# Patient Record
Sex: MALE | Race: WHITE | NOT HISPANIC OR LATINO | Employment: FULL TIME | URBAN - NONMETROPOLITAN AREA
[De-identification: names, ages, dates, MRNs, and addresses within clinical notes are randomized per-mention and may not be internally consistent; named-entity substitution may affect disease eponyms.]

---

## 2018-08-07 ENCOUNTER — OFFICE VISIT (OUTPATIENT)
Dept: ORTHOPEDIC SURGERY | Facility: CLINIC | Age: 23
End: 2018-08-07

## 2018-08-07 VITALS — RESPIRATION RATE: 18 BRPM | HEIGHT: 74 IN | BODY MASS INDEX: 40.43 KG/M2 | WEIGHT: 315 LBS

## 2018-08-07 DIAGNOSIS — L60.0 INGROWN NAIL: Primary | ICD-10-CM

## 2018-08-07 PROCEDURE — 99203 OFFICE O/P NEW LOW 30 MIN: CPT | Performed by: PODIATRIST

## 2018-08-07 PROCEDURE — 11750 EXCISION NAIL&NAIL MATRIX: CPT | Performed by: PODIATRIST

## 2018-08-07 RX ORDER — DOXYCYCLINE 50 MG/1
100 CAPSULE ORAL EVERY 12 HOURS SCHEDULED
Qty: 20 CAPSULE | Refills: 0 | Status: SHIPPED | OUTPATIENT
Start: 2018-08-07

## 2018-08-07 RX ORDER — DOXYCYCLINE 50 MG/1
100 CAPSULE ORAL EVERY 12 HOURS SCHEDULED
Qty: 20 CAPSULE | Refills: 0 | Status: SHIPPED | OUTPATIENT
Start: 2018-08-07 | End: 2018-08-07 | Stop reason: SDUPTHER

## 2018-08-07 RX ORDER — AMOXICILLIN AND CLAVULANATE POTASSIUM 875; 125 MG/1; MG/1
TABLET, FILM COATED ORAL
Refills: 0 | COMMUNITY
Start: 2018-07-19

## 2018-08-07 NOTE — PROGRESS NOTES
Subjective   Patient ID: Rodney Davidson is a 23 y.o. male   Ingrown Toenail of the Left Foot (Left great toe) and Ingrown Toenail of the Right Knee (Right great toe)  Presents today with complaint of bilateral great toe ingrown toenails.  States about a year ago he had these removed and within 4 months they return.  States he's been soaking the minutes and salt and has been on a 10 day course of antibiotics which he finished yesterday.  Still complains of pain in both great toes.  States he like to have them removed permanently effect to be done.    History of Present Illness                                                   Review of Systems   Constitutional: Negative for diaphoresis, fever and unexpected weight change.   HENT: Negative for dental problem and sore throat.    Eyes: Negative for visual disturbance.   Respiratory: Negative for shortness of breath.    Cardiovascular: Negative for chest pain.   Gastrointestinal: Negative for abdominal pain, constipation, diarrhea, nausea and vomiting.   Genitourinary: Negative for difficulty urinating and frequency.   Musculoskeletal: Positive for arthralgias.   Neurological: Negative for headaches.   Hematological: Does not bruise/bleed easily.   All other systems reviewed and are negative.      History reviewed. No pertinent past medical history.     History reviewed. No pertinent surgical history.    Allergies   Allergen Reactions   • Sulfa Antibiotics Hives         Current Outpatient Prescriptions:   •  amoxicillin-clavulanate (AUGMENTIN) 875-125 MG per tablet, , Disp: , Rfl: 0    History reviewed. No pertinent family history.    Social History     Social History   • Marital status:      Spouse name: N/A   • Number of children: N/A   • Years of education: N/A     Occupational History   • Not on file.     Social History Main Topics   • Smoking status: Never Smoker   • Smokeless tobacco: Never Used   • Alcohol use No   • Drug use: No   • Sexual activity: Defer      Other Topics Concern   • Not on file     Social History Narrative   • No narrative on file       Counseling given: Not Answered       I have reviewed all of the above social hx, family hx, surgical hx, medications, allergies & ROS and confirm that it is accurate.  Objective   Physical Exam   Constitutional: He is oriented to person, place, and time. He appears well-developed and well-nourished.   HENT:   Head: Normocephalic and atraumatic.   Nose: Nose normal.   Eyes: Pupils are equal, round, and reactive to light. Conjunctivae and EOM are normal.   Neck: Normal range of motion.   Pulmonary/Chest: Effort normal.   Abdominal: Soft.   Neurological: He is alert and oriented to person, place, and time.   Skin: Skin is warm.   Psychiatric: He has a normal mood and affect. His behavior is normal. Judgment and thought content normal.   Nursing note and vitals reviewed.    Ortho Exam  Ortho Exambilateral  Lower extremity exam:  Vascular: Pulses palpable, pedal hair noted, CFT brisk, no edema noted  Neuro: Gross sensation intact  Derm: Normal turgor and temperature, lateral borders of both great toenails are incurvated and swollen and edematous with the pus and purulence and drainage.  They have tried scab and proud flesh noted.  Nails are cut very irregular and sharp.  Both hallux are tender to palpation.  MSK: Joint range of motion normal, manual muscle testing normal,    Assessment/Plan bilateral hallux lateral toenail border ingrown toenail Independent Review of Radiographic Studies:      Laboratory and Other Studies:     Medical Decision Making:        Nail Removal  Date/Time: 8/7/2018 4:13 PM  Performed by: DESTINI RAMÍREZ  Authorized by: DESTINI RAMÍREZ   Consent: Verbal consent obtained. Written consent obtained.  Risks and benefits: risks, benefits and alternatives were discussed  Consent given by: patient  Patient understanding: patient states understanding of the procedure being performed  Patient consent:  "the patient's understanding of the procedure matches consent given  Procedure consent: procedure consent matches procedure scheduled  Relevant documents: relevant documents present and verified  Test results: test results available and properly labeled  Site marked: the operative site was marked  Imaging studies: imaging studies available  Patient identity confirmed: verbally with patient  Time out: Immediately prior to procedure a \"time out\" was called to verify the correct patient, procedure, equipment, support staff and site/side marked as required.  Location: left foot  Location details: left big toe  Anesthesia: digital block    Anesthesia:  Local Anesthetic: lidocaine 1% without epinephrine, bupivacaine 0.5% without epinephrine and topical anesthetic  Anesthetic total: 5 mL    Sedation:  Patient sedated: no  Preparation: skin prepped with Betadine and sterile field established  Amount removed: partial  Side: lateral  Nail matrix removed: partial  Dressing: 4x4, gauze roll, antibiotic ointment and dressing applied  Patient tolerance: Patient tolerated the procedure well with no immediate complications  Comments: Phenol used    Nail Removal  Date/Time: 8/7/2018 4:13 PM  Performed by: DESTINI RAMÍREZ  Authorized by: DESTINI RAMÍREZ   Consent: Verbal consent obtained. Written consent obtained.  Risks and benefits: risks, benefits and alternatives were discussed  Consent given by: patient  Patient understanding: patient states understanding of the procedure being performed  Patient consent: the patient's understanding of the procedure matches consent given  Procedure consent: procedure consent matches procedure scheduled  Relevant documents: relevant documents present and verified  Test results: test results available and properly labeled  Site marked: the operative site was marked  Imaging studies: imaging studies available  Patient identity confirmed: verbally with patient  Time out: Immediately prior to " "procedure a \"time out\" was called to verify the correct patient, procedure, equipment, support staff and site/side marked as required.  Location: right foot  Location details: right big toe  Anesthesia: digital block    Anesthesia:  Local Anesthetic: lidocaine 1% without epinephrine, bupivacaine 0.5% without epinephrine and topical anesthetic  Anesthetic total: 5 mL  Preparation: skin prepped with Betadine and sterile field established  Amount removed: partial  Side: lateral  Nail bed sutured: no  Nail matrix removed: partial  Dressing: 4x4, antibiotic ointment and gauze roll  Patient tolerance: Patient tolerated the procedure well with no immediate complications  Comments: Phenol used        [] No procedures were performed in office today.    Rodney was seen today for ingrown toenail and ingrown toenail.    Diagnoses and all orders for this visit:    Ingrown nail  -     Nail Removal  -     Nail Removal          Recommendations/Plan:  Patient's nail was removed without incident and a dry sterile dressing placed.  There given postop nail instructions.  They're to soak this foot daily for 15 minutes in warm Epsom salt water or dial soapy water. They are to keep it clean dry and covered with a Band-Aid.  I warned them of any signs of infection and to keep this out of any dirty water.  follow up in 2 weeks.  He'll be sent wound care products to apply to both great toes daily.  I will also continue him on antibiotics and prescribed doxycycline for 7 days.  I warned him of the potential for recurrence and lack of effectiveness of phenol in the presence of infection but he states he is busy with work and school and does not want to have to come back for many additional visits and he would like to have them both taking care of today.    Return in about 2 weeks (around 8/21/2018).  Patient agreeable to call or return sooner for any concerns.       "

## 2019-08-05 ENCOUNTER — TRANSCRIBE ORDERS (OUTPATIENT)
Dept: ADMINISTRATIVE | Facility: HOSPITAL | Age: 24
End: 2019-08-05

## 2019-08-05 DIAGNOSIS — E66.3 OVER WEIGHT: ICD-10-CM

## 2019-08-05 DIAGNOSIS — R53.83 FATIGUE, UNSPECIFIED TYPE: ICD-10-CM

## 2019-08-05 DIAGNOSIS — B07.9 VIRAL WARTS, UNSPECIFIED TYPE: ICD-10-CM

## 2019-08-05 DIAGNOSIS — L60.0 INGROWING NAIL: ICD-10-CM

## 2019-08-05 DIAGNOSIS — K21.9 GASTRO-ESOPHAGEAL REFLUX DISEASE WITHOUT ESOPHAGITIS: Primary | ICD-10-CM

## 2019-08-05 DIAGNOSIS — L03.031 CELLULITIS OF TOE, RIGHT: ICD-10-CM

## 2019-08-13 ENCOUNTER — HOSPITAL ENCOUNTER (OUTPATIENT)
Dept: ULTRASOUND IMAGING | Facility: HOSPITAL | Age: 24
End: 2019-08-13

## 2019-08-13 ENCOUNTER — APPOINTMENT (OUTPATIENT)
Dept: ULTRASOUND IMAGING | Facility: HOSPITAL | Age: 24
End: 2019-08-13

## 2019-08-20 ENCOUNTER — HOSPITAL ENCOUNTER (OUTPATIENT)
Dept: ULTRASOUND IMAGING | Facility: HOSPITAL | Age: 24
Discharge: HOME | End: 2019-08-20
Admitting: NURSE PRACTITIONER

## 2019-08-20 DIAGNOSIS — K21.9 GASTRO-ESOPHAGEAL REFLUX DISEASE WITHOUT ESOPHAGITIS: ICD-10-CM

## 2019-08-20 DIAGNOSIS — L60.0 INGROWING NAIL: ICD-10-CM

## 2019-08-20 DIAGNOSIS — E66.3 OVER WEIGHT: ICD-10-CM

## 2019-08-20 DIAGNOSIS — L03.031 CELLULITIS OF TOE, RIGHT: ICD-10-CM

## 2019-08-20 DIAGNOSIS — R53.83 FATIGUE, UNSPECIFIED TYPE: ICD-10-CM

## 2019-08-20 DIAGNOSIS — B07.9 VIRAL WARTS, UNSPECIFIED TYPE: ICD-10-CM

## 2019-08-20 PROCEDURE — 76705 ECHO EXAM OF ABDOMEN: CPT

## 2019-08-26 ENCOUNTER — DOCUMENTATION (OUTPATIENT)
Dept: NUTRITION | Facility: HOSPITAL | Age: 24
End: 2019-08-26

## 2019-08-26 NOTE — PROGRESS NOTES
Nutrition Services    Patient Name:  Rodney Davidson  YOB: 1995  MRN: [id number]  Admit Date:  (Not on file)    Scheduling unable to reach pt by phone x 3 attempts and 3 days to schedule a nutrition appointment. A follow-up letter is being mailed this date with program contact information.    Electronically signed by:  Judith Thomson RD  08/26/19 8:38 AM

## 2019-09-26 ENCOUNTER — TRANSCRIBE ORDERS (OUTPATIENT)
Dept: LAB | Facility: HOSPITAL | Age: 24
End: 2019-09-26

## 2019-09-26 ENCOUNTER — APPOINTMENT (OUTPATIENT)
Dept: LAB | Facility: HOSPITAL | Age: 24
End: 2019-09-26

## 2019-09-26 DIAGNOSIS — N46.9 INFERTILITY MALE: Primary | ICD-10-CM

## 2019-09-26 LAB
COLOR SMN: NORMAL
LIQUEFACTION TIME SMN: YES MIN
PH SMN: 8 [PH] (ref 7.2–10)
SPECIMEN VOL SMN: 3.5 ML (ref 2–100)
SPERM SMN: NORMAL
VISC SMN: 2 CM

## 2019-09-26 PROCEDURE — 89320 SEMEN ANAL VOL/COUNT/MOT: CPT | Performed by: ADVANCED PRACTICE MIDWIFE

## 2019-10-17 ENCOUNTER — TRANSCRIBE ORDERS (OUTPATIENT)
Dept: LAB | Facility: HOSPITAL | Age: 24
End: 2019-10-17

## 2019-10-17 ENCOUNTER — LAB (OUTPATIENT)
Dept: LAB | Facility: HOSPITAL | Age: 24
End: 2019-10-17

## 2019-10-17 DIAGNOSIS — N46.01 AZOOSPERMIA: ICD-10-CM

## 2019-10-17 DIAGNOSIS — N46.01 AZOOSPERMIA: Primary | ICD-10-CM

## 2019-10-17 LAB
COLOR SMN: ABNORMAL
LIQUEFACTION TIME SMN: YES MIN
MAJOR STRUCTURAL DEFECTS, %: 2 %
PH SMN: 8 [PH] (ref 7.2–10)
SPECIMEN VOL SMN: 3.5 ML (ref 2–100)
SPERM # SMN: 0.7 MILLIONS/ML (ref 40–1000)
SPERM DENSITY: 0.2 MILLIONS/ML (ref 20–1000)
SPERM MOTILE NFR SMN: 0 MILLIONS/ML (ref 20–1000)
SPERM MOTILE NFR SMN: 0.01 % MOTILE (ref 50–100)
SPERM SMN: ABNORMAL
VISC SMN: 1 CM

## 2019-10-17 PROCEDURE — 89320 SEMEN ANAL VOL/COUNT/MOT: CPT

## 2019-10-17 PROCEDURE — 84403 ASSAY OF TOTAL TESTOSTERONE: CPT

## 2019-10-17 PROCEDURE — 84402 ASSAY OF FREE TESTOSTERONE: CPT

## 2019-10-17 PROCEDURE — 36415 COLL VENOUS BLD VENIPUNCTURE: CPT

## 2019-10-20 LAB
TESTOST FREE SERPL-MCNC: 5.2 PG/ML (ref 9.3–26.5)
TESTOST SERPL-MCNC: 201 NG/DL (ref 264–916)

## 2022-08-27 ENCOUNTER — APPOINTMENT (OUTPATIENT)
Dept: CT IMAGING | Facility: HOSPITAL | Age: 27
DRG: 440 | End: 2022-08-27
Payer: COMMERCIAL

## 2022-08-27 ENCOUNTER — HOSPITAL ENCOUNTER (INPATIENT)
Facility: HOSPITAL | Age: 27
LOS: 1 days | Discharge: ANOTHER HOSPITAL | DRG: 440 | End: 2022-08-28
Attending: FAMILY MEDICINE | Admitting: INTERNAL MEDICINE
Payer: COMMERCIAL

## 2022-08-27 DIAGNOSIS — K85.90 ACUTE PANCREATITIS WITHOUT INFECTION OR NECROSIS, UNSPECIFIED PANCREATITIS TYPE: Primary | ICD-10-CM

## 2022-08-27 PROBLEM — E66.01 MORBID OBESITY (HCC): Status: ACTIVE | Noted: 2022-08-27

## 2022-08-27 PROBLEM — Z90.3 H/O GASTRIC SLEEVE: Status: ACTIVE | Noted: 2022-08-27

## 2022-08-27 PROBLEM — R74.01 TRANSAMINITIS: Status: ACTIVE | Noted: 2022-08-27

## 2022-08-27 PROBLEM — E80.6 HYPERBILIRUBINEMIA: Status: ACTIVE | Noted: 2022-08-27

## 2022-08-27 LAB
A/G RATIO: 1.5 (ref 0.8–2)
A/G RATIO: 1.5 (ref 0.8–2)
ALBUMIN SERPL-MCNC: 4.4 G/DL (ref 3.4–4.8)
ALBUMIN SERPL-MCNC: 4.9 G/DL (ref 3.4–4.8)
ALP BLD-CCNC: 128 U/L (ref 25–100)
ALP BLD-CCNC: 142 U/L (ref 25–100)
ALT SERPL-CCNC: 403 U/L (ref 4–36)
ALT SERPL-CCNC: 465 U/L (ref 4–36)
AMPHETAMINE SCREEN, URINE: ABNORMAL
ANION GAP SERPL CALCULATED.3IONS-SCNC: 13 MMOL/L (ref 3–16)
ANION GAP SERPL CALCULATED.3IONS-SCNC: 19 MMOL/L (ref 3–16)
AST SERPL-CCNC: 237 U/L (ref 8–33)
AST SERPL-CCNC: 289 U/L (ref 8–33)
BARBITURATE SCREEN URINE: ABNORMAL
BASOPHILS ABSOLUTE: 0 K/UL (ref 0–0.1)
BASOPHILS RELATIVE PERCENT: 0.2 %
BENZODIAZEPINE SCREEN, URINE: ABNORMAL
BILIRUB SERPL-MCNC: 4.1 MG/DL (ref 0.3–1.2)
BILIRUB SERPL-MCNC: 4.5 MG/DL (ref 0.3–1.2)
BUN BLDV-MCNC: 10 MG/DL (ref 6–20)
BUN BLDV-MCNC: 10 MG/DL (ref 6–20)
BUPRENORPHINE QUAL, URINE: ABNORMAL
CALCIUM SERPL-MCNC: 9.2 MG/DL (ref 8.5–10.5)
CALCIUM SERPL-MCNC: 9.7 MG/DL (ref 8.5–10.5)
CANNABINOID SCREEN URINE: ABNORMAL
CHLORIDE BLD-SCNC: 101 MMOL/L (ref 98–107)
CHLORIDE BLD-SCNC: 98 MMOL/L (ref 98–107)
CHOLESTEROL, TOTAL: 167 MG/DL (ref 0–200)
CO2: 20 MMOL/L (ref 20–30)
CO2: 24 MMOL/L (ref 20–30)
COCAINE METABOLITE SCREEN URINE: ABNORMAL
CREAT SERPL-MCNC: 0.9 MG/DL (ref 0.4–1.2)
CREAT SERPL-MCNC: 1 MG/DL (ref 0.4–1.2)
EOSINOPHILS ABSOLUTE: 0.2 K/UL (ref 0–0.4)
EOSINOPHILS RELATIVE PERCENT: 1.4 %
ETHANOL: NORMAL MG/DL (ref 0–0.08)
GFR AFRICAN AMERICAN: >59
GFR AFRICAN AMERICAN: >59
GFR NON-AFRICAN AMERICAN: >59
GFR NON-AFRICAN AMERICAN: >59
GLOBULIN: 2.9 G/DL
GLOBULIN: 3.3 G/DL
GLUCOSE BLD-MCNC: 119 MG/DL (ref 74–106)
GLUCOSE BLD-MCNC: 149 MG/DL (ref 74–106)
HAV IGM SER IA-ACNC: NORMAL
HCT VFR BLD CALC: 48.5 % (ref 40–54)
HDLC SERPL-MCNC: 49 MG/DL (ref 40–60)
HEMOGLOBIN: 16.5 G/DL (ref 13–18)
HEPATITIS B CORE IGM ANTIBODY: NORMAL
HEPATITIS B SURFACE ANTIGEN INTERPRETATION: NORMAL
HEPATITIS C ANTIBODY INTERPRETATION: NORMAL
IMMATURE GRANULOCYTES #: 0 K/UL
IMMATURE GRANULOCYTES %: 0.3 % (ref 0–5)
LDL CHOLESTEROL CALCULATED: 104 MG/DL
LIPASE: >3000 U/L (ref 5.6–51.3)
LYMPHOCYTES ABSOLUTE: 5 K/UL (ref 1.5–4)
LYMPHOCYTES RELATIVE PERCENT: 41.3 %
Lab: ABNORMAL
MAGNESIUM: 2 MG/DL (ref 1.7–2.4)
MCH RBC QN AUTO: 28.9 PG (ref 27–32)
MCHC RBC AUTO-ENTMCNC: 34 G/DL (ref 31–35)
MCV RBC AUTO: 84.9 FL (ref 80–100)
METHADONE SCREEN, URINE: ABNORMAL
METHAMPHETAMINE, URINE: ABNORMAL
MONOCYTES ABSOLUTE: 0.9 K/UL (ref 0.2–0.8)
MONOCYTES RELATIVE PERCENT: 7 %
NEUTROPHILS ABSOLUTE: 6 K/UL (ref 2–7.5)
NEUTROPHILS RELATIVE PERCENT: 49.8 %
OPIATE SCREEN URINE: POSITIVE
PDW BLD-RTO: 12.6 % (ref 11–16)
PHENCYCLIDINE SCREEN URINE: ABNORMAL
PLATELET # BLD: 307 K/UL (ref 150–400)
PMV BLD AUTO: 11.2 FL (ref 6–10)
POTASSIUM SERPL-SCNC: 3 MMOL/L (ref 3.4–5.1)
POTASSIUM SERPL-SCNC: 4.1 MMOL/L (ref 3.4–5.1)
PROPOXYPHENE SCREEN, URINE: ABNORMAL
RBC # BLD: 5.71 M/UL (ref 4.5–6)
SARS-COV-2, NAAT: NOT DETECTED
SODIUM BLD-SCNC: 137 MMOL/L (ref 136–145)
SODIUM BLD-SCNC: 138 MMOL/L (ref 136–145)
TOTAL PROTEIN: 7.3 G/DL (ref 6.4–8.3)
TOTAL PROTEIN: 8.2 G/DL (ref 6.4–8.3)
TRICYCLIC, URINE: ABNORMAL
TRIGL SERPL-MCNC: 72 MG/DL (ref 0–249)
TROPONIN: <0.3 NG/ML
UR OXYCODONE RAPID SCREEN: ABNORMAL
VLDLC SERPL CALC-MCNC: 14 MG/DL
WBC # BLD: 12.1 K/UL (ref 4–11)

## 2022-08-27 PROCEDURE — 74177 CT ABD & PELVIS W/CONTRAST: CPT

## 2022-08-27 PROCEDURE — A4216 STERILE WATER/SALINE, 10 ML: HCPCS | Performed by: PHYSICIAN ASSISTANT

## 2022-08-27 PROCEDURE — 80074 ACUTE HEPATITIS PANEL: CPT

## 2022-08-27 PROCEDURE — 80061 LIPID PANEL: CPT

## 2022-08-27 PROCEDURE — C9113 INJ PANTOPRAZOLE SODIUM, VIA: HCPCS | Performed by: PHYSICIAN ASSISTANT

## 2022-08-27 PROCEDURE — 6360000002 HC RX W HCPCS: Performed by: FAMILY MEDICINE

## 2022-08-27 PROCEDURE — 93005 ELECTROCARDIOGRAM TRACING: CPT

## 2022-08-27 PROCEDURE — 87635 SARS-COV-2 COVID-19 AMP PRB: CPT

## 2022-08-27 PROCEDURE — 99222 1ST HOSP IP/OBS MODERATE 55: CPT | Performed by: INTERNAL MEDICINE

## 2022-08-27 PROCEDURE — 6370000000 HC RX 637 (ALT 250 FOR IP): Performed by: FAMILY MEDICINE

## 2022-08-27 PROCEDURE — 99285 EMERGENCY DEPT VISIT HI MDM: CPT

## 2022-08-27 PROCEDURE — 6360000004 HC RX CONTRAST MEDICATION: Performed by: FAMILY MEDICINE

## 2022-08-27 PROCEDURE — 96375 TX/PRO/DX INJ NEW DRUG ADDON: CPT

## 2022-08-27 PROCEDURE — 96374 THER/PROPH/DIAG INJ IV PUSH: CPT

## 2022-08-27 PROCEDURE — 6360000002 HC RX W HCPCS: Performed by: PHYSICIAN ASSISTANT

## 2022-08-27 PROCEDURE — 80307 DRUG TEST PRSMV CHEM ANLYZR: CPT

## 2022-08-27 PROCEDURE — 6370000000 HC RX 637 (ALT 250 FOR IP): Performed by: PHYSICIAN ASSISTANT

## 2022-08-27 PROCEDURE — 85025 COMPLETE CBC W/AUTO DIFF WBC: CPT

## 2022-08-27 PROCEDURE — 82077 ASSAY SPEC XCP UR&BREATH IA: CPT

## 2022-08-27 PROCEDURE — 84484 ASSAY OF TROPONIN QUANT: CPT

## 2022-08-27 PROCEDURE — 80053 COMPREHEN METABOLIC PANEL: CPT

## 2022-08-27 PROCEDURE — 83690 ASSAY OF LIPASE: CPT

## 2022-08-27 PROCEDURE — 2580000003 HC RX 258: Performed by: PHYSICIAN ASSISTANT

## 2022-08-27 PROCEDURE — 2580000003 HC RX 258: Performed by: FAMILY MEDICINE

## 2022-08-27 PROCEDURE — 83735 ASSAY OF MAGNESIUM: CPT

## 2022-08-27 PROCEDURE — 36415 COLL VENOUS BLD VENIPUNCTURE: CPT

## 2022-08-27 PROCEDURE — 1200000000 HC SEMI PRIVATE

## 2022-08-27 RX ORDER — ONDANSETRON 4 MG/1
4 TABLET, ORALLY DISINTEGRATING ORAL EVERY 8 HOURS PRN
Status: DISCONTINUED | OUTPATIENT
Start: 2022-08-27 | End: 2022-08-28 | Stop reason: HOSPADM

## 2022-08-27 RX ORDER — 0.9 % SODIUM CHLORIDE 0.9 %
1000 INTRAVENOUS SOLUTION INTRAVENOUS ONCE
Status: COMPLETED | OUTPATIENT
Start: 2022-08-27 | End: 2022-08-27

## 2022-08-27 RX ORDER — MORPHINE SULFATE 4 MG/ML
2 INJECTION, SOLUTION INTRAMUSCULAR; INTRAVENOUS ONCE
Status: COMPLETED | OUTPATIENT
Start: 2022-08-27 | End: 2022-08-27

## 2022-08-27 RX ORDER — KETOROLAC TROMETHAMINE 30 MG/ML
30 INJECTION, SOLUTION INTRAMUSCULAR; INTRAVENOUS ONCE
Status: COMPLETED | OUTPATIENT
Start: 2022-08-27 | End: 2022-08-27

## 2022-08-27 RX ORDER — ENOXAPARIN SODIUM 100 MG/ML
30 INJECTION SUBCUTANEOUS 2 TIMES DAILY
Status: DISCONTINUED | OUTPATIENT
Start: 2022-08-27 | End: 2022-08-28 | Stop reason: HOSPADM

## 2022-08-27 RX ORDER — SODIUM CHLORIDE, SODIUM LACTATE, POTASSIUM CHLORIDE, CALCIUM CHLORIDE 600; 310; 30; 20 MG/100ML; MG/100ML; MG/100ML; MG/100ML
INJECTION, SOLUTION INTRAVENOUS CONTINUOUS
Status: DISCONTINUED | OUTPATIENT
Start: 2022-08-27 | End: 2022-08-28 | Stop reason: HOSPADM

## 2022-08-27 RX ORDER — MORPHINE SULFATE 2 MG/ML
2 INJECTION, SOLUTION INTRAMUSCULAR; INTRAVENOUS
Status: DISCONTINUED | OUTPATIENT
Start: 2022-08-27 | End: 2022-08-27

## 2022-08-27 RX ORDER — ONDANSETRON 4 MG/1
4 TABLET, ORALLY DISINTEGRATING ORAL ONCE
Status: DISCONTINUED | OUTPATIENT
Start: 2022-08-27 | End: 2022-08-27

## 2022-08-27 RX ORDER — SERTRALINE HYDROCHLORIDE 100 MG/1
50 TABLET, FILM COATED ORAL DAILY
COMMUNITY

## 2022-08-27 RX ORDER — ONDANSETRON 2 MG/ML
4 INJECTION INTRAMUSCULAR; INTRAVENOUS EVERY 6 HOURS PRN
Status: DISCONTINUED | OUTPATIENT
Start: 2022-08-27 | End: 2022-08-28 | Stop reason: HOSPADM

## 2022-08-27 RX ORDER — SODIUM CHLORIDE, SODIUM LACTATE, POTASSIUM CHLORIDE, CALCIUM CHLORIDE 600; 310; 30; 20 MG/100ML; MG/100ML; MG/100ML; MG/100ML
INJECTION, SOLUTION INTRAVENOUS CONTINUOUS
Status: ACTIVE | OUTPATIENT
Start: 2022-08-27 | End: 2022-08-27

## 2022-08-27 RX ORDER — ENOXAPARIN SODIUM 100 MG/ML
40 INJECTION SUBCUTANEOUS DAILY
Status: DISCONTINUED | OUTPATIENT
Start: 2022-08-27 | End: 2022-08-27 | Stop reason: DRUGHIGH

## 2022-08-27 RX ORDER — POTASSIUM CHLORIDE 750 MG/1
40 CAPSULE, EXTENDED RELEASE ORAL ONCE
Status: COMPLETED | OUTPATIENT
Start: 2022-08-27 | End: 2022-08-27

## 2022-08-27 RX ORDER — OXYCODONE HYDROCHLORIDE 5 MG/1
5 TABLET ORAL EVERY 4 HOURS PRN
Status: DISCONTINUED | OUTPATIENT
Start: 2022-08-27 | End: 2022-08-28 | Stop reason: HOSPADM

## 2022-08-27 RX ORDER — ONDANSETRON 2 MG/ML
4 INJECTION INTRAMUSCULAR; INTRAVENOUS ONCE
Status: COMPLETED | OUTPATIENT
Start: 2022-08-27 | End: 2022-08-27

## 2022-08-27 RX ADMIN — KETOROLAC TROMETHAMINE 30 MG: 30 INJECTION, SOLUTION INTRAMUSCULAR at 04:02

## 2022-08-27 RX ADMIN — ONDANSETRON 4 MG: 2 INJECTION INTRAMUSCULAR; INTRAVENOUS at 04:03

## 2022-08-27 RX ADMIN — HYDROMORPHONE HYDROCHLORIDE 0.5 MG: 1 INJECTION, SOLUTION INTRAMUSCULAR; INTRAVENOUS; SUBCUTANEOUS at 13:37

## 2022-08-27 RX ADMIN — ENOXAPARIN SODIUM 30 MG: 100 INJECTION SUBCUTANEOUS at 20:50

## 2022-08-27 RX ADMIN — SODIUM CHLORIDE 40 MG: 9 INJECTION INTRAMUSCULAR; INTRAVENOUS; SUBCUTANEOUS at 09:08

## 2022-08-27 RX ADMIN — ONDANSETRON 4 MG: 2 INJECTION INTRAMUSCULAR; INTRAVENOUS at 22:13

## 2022-08-27 RX ADMIN — HYDROMORPHONE HYDROCHLORIDE 0.5 MG: 1 INJECTION, SOLUTION INTRAMUSCULAR; INTRAVENOUS; SUBCUTANEOUS at 18:57

## 2022-08-27 RX ADMIN — OXYCODONE HYDROCHLORIDE 5 MG: 5 TABLET ORAL at 11:11

## 2022-08-27 RX ADMIN — MORPHINE SULFATE 2 MG: 2 INJECTION, SOLUTION INTRAMUSCULAR; INTRAVENOUS at 08:32

## 2022-08-27 RX ADMIN — SODIUM CHLORIDE, POTASSIUM CHLORIDE, SODIUM LACTATE AND CALCIUM CHLORIDE: 600; 310; 30; 20 INJECTION, SOLUTION INTRAVENOUS at 20:53

## 2022-08-27 RX ADMIN — HYDROMORPHONE HYDROCHLORIDE 0.5 MG: 1 INJECTION, SOLUTION INTRAMUSCULAR; INTRAVENOUS; SUBCUTANEOUS at 22:13

## 2022-08-27 RX ADMIN — HYDROMORPHONE HYDROCHLORIDE 0.5 MG: 1 INJECTION, SOLUTION INTRAMUSCULAR; INTRAVENOUS; SUBCUTANEOUS at 06:36

## 2022-08-27 RX ADMIN — LIDOCAINE HYDROCHLORIDE: 20 SOLUTION ORAL; TOPICAL at 04:02

## 2022-08-27 RX ADMIN — IOPAMIDOL 100 ML: 755 INJECTION, SOLUTION INTRAVENOUS at 05:04

## 2022-08-27 RX ADMIN — MORPHINE SULFATE 2 MG: 4 INJECTION, SOLUTION INTRAMUSCULAR; INTRAVENOUS at 04:48

## 2022-08-27 RX ADMIN — ONDANSETRON 4 MG: 2 INJECTION INTRAMUSCULAR; INTRAVENOUS at 09:26

## 2022-08-27 RX ADMIN — ONDANSETRON 4 MG: 2 INJECTION INTRAMUSCULAR; INTRAVENOUS at 18:57

## 2022-08-27 RX ADMIN — SERTRALINE HYDROCHLORIDE 50 MG: 50 TABLET ORAL at 20:50

## 2022-08-27 RX ADMIN — OXYCODONE HYDROCHLORIDE 5 MG: 5 TABLET ORAL at 20:50

## 2022-08-27 RX ADMIN — OXYCODONE HYDROCHLORIDE 5 MG: 5 TABLET ORAL at 16:44

## 2022-08-27 RX ADMIN — POTASSIUM CHLORIDE 40 MEQ: 10 CAPSULE, COATED, EXTENDED RELEASE ORAL at 08:35

## 2022-08-27 RX ADMIN — HYDROMORPHONE HYDROCHLORIDE 0.5 MG: 1 INJECTION, SOLUTION INTRAMUSCULAR; INTRAVENOUS; SUBCUTANEOUS at 09:48

## 2022-08-27 RX ADMIN — SODIUM CHLORIDE, POTASSIUM CHLORIDE, SODIUM LACTATE AND CALCIUM CHLORIDE: 600; 310; 30; 20 INJECTION, SOLUTION INTRAVENOUS at 09:19

## 2022-08-27 RX ADMIN — SODIUM CHLORIDE 1000 ML: 9 INJECTION, SOLUTION INTRAVENOUS at 04:06

## 2022-08-27 ASSESSMENT — PAIN SCALES - GENERAL
PAINLEVEL_OUTOF10: 8
PAINLEVEL_OUTOF10: 7
PAINLEVEL_OUTOF10: 6
PAINLEVEL_OUTOF10: 7
PAINLEVEL_OUTOF10: 7
PAINLEVEL_OUTOF10: 9
PAINLEVEL_OUTOF10: 9
PAINLEVEL_OUTOF10: 7

## 2022-08-27 ASSESSMENT — ENCOUNTER SYMPTOMS
NAUSEA: 1
VOMITING: 1
ABDOMINAL PAIN: 1

## 2022-08-27 NOTE — ED PROVIDER NOTES
[address]  eMERGENCY dEPARTMENT eNCOUnter      Pt Name: Marce Boo  MRN: [id number]  [place] 1995  Date of evaluation: 8/27/2022  Provider: Ramu Bar Dr       Chief Complaint   Patient presents with    Abdominal Pain    Chest Pain     2 hrs ago, woke him up from sleep      Emesis         HISTORY OF PRESENT ILLNESS   (Location/Symptom, Timing/Onset, Context/Setting, Quality, Duration, Modifying Factors, Severity)  Note limiting factors. Marce Boo is a 24 y.o. male who presents to the emergency department for waking up with epigastric abdominal pain, pain radiating to his mid chest, having multiple episodes of nausea and vomiting. Pt states that the pain woke him up and started vomiting shortly after that. He thought where he had gastric sleeve in past and trouble with dairy that since he consumed some, it could have caused his symptoms. No shortness of breath, no back pain. No diarrhea. No fever. No flank pain. Pain 8/10, sharp, aching, constant. No change with movement. Pt noted to be sweating when he arrived. Nursing Notes were reviewed. REVIEW OF SYSTEMS    (2-9 systems for level 4, 10 or more forlevel 5)     Review of Systems   Constitutional:  Positive for activity change, appetite change and diaphoresis. Cardiovascular:  Positive for chest pain. Gastrointestinal:  Positive for abdominal pain, nausea and vomiting. All other systems reviewed and are negative. PAST MEDICAL HISTORY     Past Medical History:   Diagnosis Date    Anxiety     Depression          SURGICAL HISTORY     No past surgical history on file.       CURRENT MEDICATIONS       Current Discharge Medication List        CONTINUE these medications which have NOT CHANGED    Details   sertraline (ZOLOFT) 100 MG tablet Take 50 mg by mouth daily Unsure of dose             ALLERGIES     Sulfa antibiotics    FAMILY HISTORY       Family History   Problem Relation Age of Onset    Heart Disease Maternal Grandmother     Diabetes Maternal Grandfather     Diabetes Paternal Grandfather           SOCIAL HISTORY       Social History     Socioeconomic History    Marital status: Single       SCREENINGS             PHYSICAL EXAM    (up to 7 for level 4, 8 or more for level 5)     ED Triage Vitals [08/27/22 0315]   BP Temp Temp src Heart Rate Resp SpO2 Height Weight   124/74 -- -- 70 23 100 % -- --       Physical Exam  Vitals and nursing note reviewed. Constitutional:       Appearance: He is well-developed. He is obese. Comments: Pt retching in the room. Holding epigastric area   HENT:      Head: Normocephalic. Eyes:      General: No scleral icterus. Extraocular Movements: Extraocular movements intact. Pupils: Pupils are equal, round, and reactive to light. Cardiovascular:      Rate and Rhythm: Normal rate and regular rhythm. Heart sounds: Normal heart sounds. Pulmonary:      Effort: Pulmonary effort is normal.      Breath sounds: Normal breath sounds. Abdominal:      General: Bowel sounds are decreased. Palpations: Abdomen is soft. Tenderness: abdominal tenderness in the epigastric area There is no right CVA tenderness or left CVA tenderness. Skin:     General: Skin is warm and dry. Coloration: Skin is not jaundiced. Neurological:      Mental Status: He is alert and oriented to person, place, and time. Psychiatric:         Mood and Affect: Mood normal.         Behavior: Behavior normal.       DIAGNOSTIC RESULTS     EKG: All EKG's are interpreted by the Emergency Department Physician who either signs or Co-signs this chart in the absence of a cardiologist.    HR 63; Sinus Rhythm; Normal  ms; Normal  ms;  No acute ST-T elevation/depression; Normal axis    RADIOLOGY:   Non-plain film images such as CT, Ultrasound and MRI are read by the radiologist. Plain radiographic images are visualized andpreliminarily interpreted by the emergency physician with the below findings:    CT abd/pelvis w/IV contrast - See below    Interpretationper the Radiologist below, if available at the time of this note:    CT ABDOMEN PELVIS W IV CONTRAST Additional Contrast? None   Final Result       Findings consistent with acute pancreatitis. No focal rounded    fluid collections. ED BEDSIDE ULTRASOUND:   Performed by ED Physician - none    LABS:  Labs Reviewed   CBC WITH AUTO DIFFERENTIAL - Abnormal; Notable for the following components:       Result Value    WBC 12.1 (*)     MPV 11.2 (*)     Lymphocytes Absolute 5.0 (*)     Monocytes Absolute 0.9 (*)     All other components within normal limits   COMPREHENSIVE METABOLIC PANEL - Abnormal; Notable for the following components:    Potassium 3.0 (*)     Anion Gap 19 (*)     Glucose 149 (*)     Albumin 4.9 (*)     Total Bilirubin 4.1 (*)     Alkaline Phosphatase 142 (*)      (*)      (*)     All other components within normal limits   LIPASE - Abnormal; Notable for the following components:    Lipase >3,000.0 (*)     All other components within normal limits   TROPONIN       All other labs were within normal range or not returned as of this dictation. EMERGENCY DEPARTMENT COURSE and DIFFERENTIAL DIAGNOSIS/MDM:   :    Vitals:    08/27/22 0511 08/27/22 0513 08/27/22 0514 08/27/22 0515   BP: 138/77   132/78   Pulse:       Resp:       SpO2:  98% 97% 98%           CRITICAL CARE TIME   Total Critical Care time was 0 minutes, excluding separatelyreportable procedures. There was a high probability ofclinically significant/life threatening deterioration in the patient's condition which required my urgent intervention. CONSULTS:  Lisa KNOWLES to discuss case for admission at this time    PROCEDURES:  None    PROGRESS NOTES:    Pt came in sweaty, having pain to chest. ECG done. Negative for acute changes/STEMI. Ordered IV and started some fluids, zofran, toradol, GI cocktail given.  Labs ordered. Pt thought it could be GERD/stomach related. Pt noted to have marked Lipase elevation consistent with pancreatitis but noted liver enzyme elevation with bilirubin elevation. Will obtain CT scan to rule out any stone causing pancreatic/mainor obstruction. Pt wanted pain meds but doesn't take much and wanted \"baby dose\". Pt still having marked pain. No evidence of choledolchololithiasis; ordered IV dilaudid. Is this patient to be included in the SEP-1 Core Measure due to severe sepsis or septic shock? No   Exclusion criteria - the patient is NOT to be included for SEP-1 Core Measure due to: Infection is not suspected     FINAL IMPRESSION      1. Acute pancreatitis without infection or necrosis, unspecified pancreatitis type New Problem         DISPOSITION/PLAN   DISPOSITION Decision To Admit 08/27/2022 06:55:12 AM      PATIENT REFERRED TO:  No follow-up provider specified.     DISCHARGE MEDICATIONS:  Current Discharge Medication List          (Please note that portions of this note were completed with a voice recognition program.  Efforts were made to edit the dictations but occasionallywords are mis-transcribed.)    Alisson Aguayo DO (electronically signed)  Attending Emergency Physician          Alisson Aguayo DO  08/27/22 3235

## 2022-08-27 NOTE — PROGRESS NOTES
Automatic Dose Adjustment of                Subcutaneous Anticoagulant for Prophylaxis    Bertin Castro is a 24 y.o. male. Recent Labs     08/27/22  0310   CREATININE 1.0       Estimated Creatinine Clearance: 162 mL/min (based on SCr of 1 mg/dL). Weight:  Wt Readings from Last 1 Encounters:   08/27/22 288 lb 8 oz (130.9 kg)           Pharmacy has adjusted subcutaneous anticoagulant for prophylaxis to Enoxaparin 30 mg SC twice daily based on the patient's weight and estimated CrCl per [facility] policy.                Electronically signed by Tyrell Moltey, [address] [place] [address] on 8/27/2022 at 8:49 AM

## 2022-08-27 NOTE — H&P
History and Physical    Patient:  Joy Addison    CHIEF COMPLAINT:    Abdominal pain, nausea, vomiting     HISTORY OF PRESENT ILLNESS:   The patient is a 24 y.o. male with PMH of obesity s/p gastric sleeve 2 years ago who presents due to abdominal pain, nausea, and vomiting. Patient states he initially thought maybe he had eaten some dairy which previously caused these symptoms and that was cause of illness however pain in his upper stomach and lower chest became severe so he came to the ER. Continued to have vomiting. No fever. No known sick contacts. Pain is mostly in the upper part of his stomach. Does not radiate to back. Diaphoretic on arrival per chart review. Denies heavy alcohol use. No history of pancreatitis. Past Medical History:      Diagnosis Date    Anxiety     Depression        Past Surgical History:  No past surgical history on file. Medications Prior to Admission:    Prior to Admission medications    Medication Sig Start Date End Date Taking? Authorizing Provider   sertraline (ZOLOFT) 100 MG tablet Take 50 mg by mouth daily Unsure of dose   Yes Historical Provider, MD       Allergies:  Sulfa antibiotics    Social History:   TOBACCO:   has no history on file for tobacco use. ETOH:   has no history on file for alcohol use. OCCUPATION:  None     Family History:       Problem Relation Age of Onset    Heart Disease Maternal Grandmother     Diabetes Maternal Grandfather     Diabetes Paternal Grandfather        Review of system  Constitutional:  Denies fever or chills . Positive for sweats. Eyes:  Denies eye pain or redness  HENT:  Denies nasal congestion or sore throat   Respiratory:  Denies cough or shortness of breath   Cardiovascular:  Denies chest pain or edema   GI:  Admits to abdominal pain, nausea, vomiting.   :  Denies dysuria or frequency  Musculoskeletal:  Denies acute neck pain or body aches  Integument:  Denies rash or itching  Neurologic:  Denies headache, dizziness, numbness, tingling or unilateral weakness  Psychiatric:  Denies acute depression or acute anxiety      Vital Signs  Temp: 97.9 °F (36.6 °C)  Heart Rate: 73  Resp: 12  BP: (!) 142/83  SpO2: 99 %          vital signs reviewed in electronic chart. Physical exam  Constitutional:  Well developed, well nourished, no acute distress  Eyes:  PERRL, no scleral icterus, conjunctiva normal   HENT:  Atraumatic, external ears normal, nose normal, oropharynx moist, no pharyngeal exudates. Neck- supple, no JVD, no lymphadenopathy  Respiratory:  No respiratory distress, no wheezing, rales or rhonchi detected  Cardiovascular:  Normal rate, normal rhythm, no murmurs, no gallops, no rubs, no edema   GI:  Soft, obese, nondistended, normal bowel sounds, TTP upper abdomen worse in RUQ and epigastric region, no voluntary guarding  Musculoskeletal:  No cyanosis or obvious acute deformity. Moving all extremities   Integument:  Warm and dry.   Neurologic:  Alert & oriented x 3, no apparent focal deficits noted   Psychiatric:  Speech and behavior appropriate         Lab Results   Component Value Date     08/27/2022    K 4.1 08/27/2022     08/27/2022    CO2 24 08/27/2022    BUN 10 08/27/2022    CREATININE 0.9 08/27/2022    GLUCOSE 119 (H) 08/27/2022    CALCIUM 9.2 08/27/2022    PROT 7.3 08/27/2022    LABALBU 4.4 08/27/2022    BILITOT 4.5 (H) 08/27/2022    ALKPHOS 128 (H) 08/27/2022     (H) 08/27/2022     (H) 08/27/2022    LABGLOM >59 08/27/2022    GFRAA >59 08/27/2022    AGRATIO 1.5 08/27/2022    GLOB 2.9 08/27/2022           Lab Results   Component Value Date    WBC 12.1 (H) 08/27/2022    HGB 16.5 08/27/2022    HCT 48.5 08/27/2022    MCV 84.9 08/27/2022     08/27/2022     CT ABDOMEN PELVIS W IV CONTRAST Additional Contrast? None   Final Result   Addendum (preliminary) 1 of 1      Patient: Nancy Boyd  Time Out: 08:37   Exam(s): CT ABDOMEN + PELVIS With Contrast        ADDENDUM - Added by Deborah Majano MD on 8/27/2022 8:36 AM (-07:00)   Please note the following corrections: The APPENDIX is normal.       The PANCREAS appears diffusely enlarged with extensive edema and    fluid surrounding it. The findings are consistent with acute    PANCREATITIS. No evidence of pancreatic necrosis or significant    pancreatic ductal dilatation.   ---------------------------------------------------------------       EXAM:     CT Abdomen and Pelvis With Intravenous Contrast       CLINICAL HISTORY:     Elevated Lipase; Elevated liver/bilirubin; Vomiting; Rule out    choledolcholithiasis. TECHNIQUE:     Axial computed tomography images of the abdomen and pelvis with    intravenous contrast.  CTDI is 23 mGy and DLP is 1321 mGy-cm. One    or more of the following dose reduction techniques were used:    automated exposure control, adjustment of the mA and/or kV    according to patient size, use of iterative reconstruction    technique. COMPARISON:     No relevant prior studies available. FINDINGS:     Lung bases:  Unremarkable. No mass. No consolidation. ABDOMEN:     Liver:  Unremarkable. No mass. Gallbladder and bile ducts:  Unremarkable. No high density    stones. No high density stones seen in the bile duct. Pancreas:  Unremarkable. No mass. No ductal dilation. Spleen:  Unremarkable. No splenomegaly. Adrenals:  Unremarkable. No mass. Kidneys and ureters:  Unremarkable. No solid mass. No    hydronephrosis. Stomach and bowel:  Previous gastric sleeve procedure. No    obstruction. No mucosal thickening. PELVIS:     Appendix:  The pancreas appears diffusely enlarged with    extensive edema and fluid surrounding it. The findings are    consistent with acute appendicitis. No evidence of pancreatic    necrosis or significant pancreatic ductal dilatation. Bladder:  Unremarkable. No mass. Reproductive:  Unremarkable as visualized.         ABDOMEN and PELVIS: Intraperitoneal space:  No focal loculated fluid collection. No    free air. Bones/joints:  No acute fracture. No dislocation. Soft tissues:  Unremarkable. Vasculature:  Unremarkable. No abdominal aortic aneurysm. Lymph nodes:  Unremarkable. No enlarged lymph nodes. IMPRESSION:            Findings consistent with acute pancreatitis. No focal rounded    fluid collections. Electronically signed by Mikel Reyes MD on 08-27-22 at 7737      Final            Assessment and [address], Problems    Diagnosis Date Noted    Acute pancreatitis without infection or necrosis [K85.90]  - Lipase > 3000  - CT abd/pelv with contrast with evidence of acute pancreatitis. No ductal dilatation. No gallstones and gallbladder unremarkable. - Lipid panel unremarkable   - Denies excessive ETOH use and ethanol negative  - Etiology unclear   - Bilirubin elevated and also with notable transaminitis concerning for obstructive picture however CT without evidence of this as above   - Hydrate aggressively with LR. Anti emetics prn. IV and PO pain medication prn. NPO and advance diet as tolerated   - afebrile. Monitor lab closely. Consider transfer and antibiotic coverage with any worsening. 08/27/2022    Transaminitis [R74.01] 08/27/2022    Hyperbilirubinemia [E80.6] 08/27/2022    Morbid obesity ([facility] [address] [E66.01]  - BMI 37  - patient reports ongoing weight loss following gastric sleeve 2 years ago  08/27/2022    H/O gastric sleeve [Z90.3] 08/27/2022     Patient was seen and examined with Dr. Ciera Kerns. After reviewing patient data and diagnostic testing the plan of care was established in conjunction with Dr. Ciera Kerns.       ELENI Dill certifies per CMS regulation for 42 CFR [phone]), that the patient may reasonably be expected to be discharged or transferred to a hospital within 96 hours after admission to [address] [place] [address]    Electronically signed by Dayanara Ahumada PA on 8/27/2022 at 11:58 AM

## 2022-08-27 NOTE — FLOWSHEET NOTE
08/27/22 0859   Assessment   Charting Type Admission   Psychosocial   Psychosocial (WDL) WDL   Neurological   Neuro (WDL) WDL   [place] Coma Scale   Eye Opening 4   Best Verbal Response 5   Best Motor Response 6   [place] Coma Scale Score 15   HEENT (Head, Ears, Eyes, Nose, & Throat)   HEENT (WDL) WDL   Respiratory   Respiratory (WDL) WDL   Cardiac   Cardiac (WDL) WDL   Gastrointestinal   Abdominal (WDL) X   Last BM (including prior to admit) 08/26/22   RUQ Bowel Sounds Hypoactive   LUQ Bowel Sounds Hypoactive   RLQ Bowel Sounds Hypoactive   LLQ Bowel Sounds Hypoactive   Tenderness Tenderness   Genitourinary   Genitourinary (WDL) WDL   Peripheral Vascular   Peripheral Vascular (WDL) WDL   Skin Integumentary    Skin Integumentary (WDL) WDL

## 2022-08-28 VITALS
SYSTOLIC BLOOD PRESSURE: 140 MMHG | RESPIRATION RATE: 20 BRPM | HEART RATE: 62 BPM | WEIGHT: 288.5 LBS | OXYGEN SATURATION: 98 % | BODY MASS INDEX: 37.02 KG/M2 | TEMPERATURE: 98.1 F | HEIGHT: 74 IN | DIASTOLIC BLOOD PRESSURE: 83 MMHG

## 2022-08-28 PROBLEM — F41.9 ANXIETY: Status: ACTIVE | Noted: 2022-08-28

## 2022-08-28 LAB
A/G RATIO: 1.4 (ref 0.8–2)
ALBUMIN SERPL-MCNC: 4 G/DL (ref 3.4–4.8)
ALP BLD-CCNC: 123 U/L (ref 25–100)
ALT SERPL-CCNC: 317 U/L (ref 4–36)
ANION GAP SERPL CALCULATED.3IONS-SCNC: 10 MMOL/L (ref 3–16)
AST SERPL-CCNC: 149 U/L (ref 8–33)
BASOPHILS ABSOLUTE: 0 K/UL (ref 0–0.1)
BASOPHILS RELATIVE PERCENT: 0.2 %
BILIRUB SERPL-MCNC: 7.2 MG/DL (ref 0.3–1.2)
BUN BLDV-MCNC: 10 MG/DL (ref 6–20)
CALCIUM SERPL-MCNC: 9.2 MG/DL (ref 8.5–10.5)
CHLORIDE BLD-SCNC: 102 MMOL/L (ref 98–107)
CO2: 28 MMOL/L (ref 20–30)
CREAT SERPL-MCNC: 0.8 MG/DL (ref 0.4–1.2)
EOSINOPHILS ABSOLUTE: 0 K/UL (ref 0–0.4)
EOSINOPHILS RELATIVE PERCENT: 0.4 %
GFR AFRICAN AMERICAN: >59
GFR NON-AFRICAN AMERICAN: >59
GLOBULIN: 2.9 G/DL
GLUCOSE BLD-MCNC: 97 MG/DL (ref 74–106)
HCT VFR BLD CALC: 43.6 % (ref 40–54)
HEMOGLOBIN: 14.5 G/DL (ref 13–18)
IMMATURE GRANULOCYTES #: 0 K/UL
IMMATURE GRANULOCYTES %: 0.4 % (ref 0–5)
LIPASE: 2463 U/L (ref 5.6–51.3)
LYMPHOCYTES ABSOLUTE: 1.4 K/UL (ref 1.5–4)
LYMPHOCYTES RELATIVE PERCENT: 13.5 %
MCH RBC QN AUTO: 29.1 PG (ref 27–32)
MCHC RBC AUTO-ENTMCNC: 33.3 G/DL (ref 31–35)
MCV RBC AUTO: 87.4 FL (ref 80–100)
MONOCYTES ABSOLUTE: 0.8 K/UL (ref 0.2–0.8)
MONOCYTES RELATIVE PERCENT: 7.8 %
NEUTROPHILS ABSOLUTE: 8.1 K/UL (ref 2–7.5)
NEUTROPHILS RELATIVE PERCENT: 77.7 %
PDW BLD-RTO: 12.6 % (ref 11–16)
PLATELET # BLD: 236 K/UL (ref 150–400)
PMV BLD AUTO: 11 FL (ref 6–10)
POTASSIUM REFLEX MAGNESIUM: 4 MMOL/L (ref 3.4–5.1)
RBC # BLD: 4.99 M/UL (ref 4.5–6)
SODIUM BLD-SCNC: 140 MMOL/L (ref 136–145)
TOTAL PROTEIN: 6.9 G/DL (ref 6.4–8.3)
WBC # BLD: 10.4 K/UL (ref 4–11)

## 2022-08-28 PROCEDURE — C9113 INJ PANTOPRAZOLE SODIUM, VIA: HCPCS | Performed by: PHYSICIAN ASSISTANT

## 2022-08-28 PROCEDURE — 2580000003 HC RX 258: Performed by: PHYSICIAN ASSISTANT

## 2022-08-28 PROCEDURE — A4216 STERILE WATER/SALINE, 10 ML: HCPCS | Performed by: PHYSICIAN ASSISTANT

## 2022-08-28 PROCEDURE — 83690 ASSAY OF LIPASE: CPT

## 2022-08-28 PROCEDURE — 85025 COMPLETE CBC W/AUTO DIFF WBC: CPT

## 2022-08-28 PROCEDURE — 6360000002 HC RX W HCPCS: Performed by: PHYSICIAN ASSISTANT

## 2022-08-28 PROCEDURE — 6370000000 HC RX 637 (ALT 250 FOR IP): Performed by: PHYSICIAN ASSISTANT

## 2022-08-28 PROCEDURE — 80053 COMPREHEN METABOLIC PANEL: CPT

## 2022-08-28 PROCEDURE — 99238 HOSP IP/OBS DSCHRG MGMT 30/<: CPT | Performed by: INTERNAL MEDICINE

## 2022-08-28 PROCEDURE — 36415 COLL VENOUS BLD VENIPUNCTURE: CPT

## 2022-08-28 RX ORDER — DOCUSATE SODIUM 100 MG/1
100 CAPSULE, LIQUID FILLED ORAL ONCE
Status: DISCONTINUED | OUTPATIENT
Start: 2022-08-28 | End: 2022-08-28 | Stop reason: HOSPADM

## 2022-08-28 RX ADMIN — SODIUM CHLORIDE 40 MG: 9 INJECTION INTRAMUSCULAR; INTRAVENOUS; SUBCUTANEOUS at 08:33

## 2022-08-28 RX ADMIN — ONDANSETRON 4 MG: 2 INJECTION INTRAMUSCULAR; INTRAVENOUS at 08:37

## 2022-08-28 RX ADMIN — HYDROMORPHONE HYDROCHLORIDE 0.5 MG: 1 INJECTION, SOLUTION INTRAMUSCULAR; INTRAVENOUS; SUBCUTANEOUS at 08:34

## 2022-08-28 RX ADMIN — OXYCODONE HYDROCHLORIDE 5 MG: 5 TABLET ORAL at 10:06

## 2022-08-28 RX ADMIN — HYDROMORPHONE HYDROCHLORIDE 0.5 MG: 1 INJECTION, SOLUTION INTRAMUSCULAR; INTRAVENOUS; SUBCUTANEOUS at 03:26

## 2022-08-28 RX ADMIN — OXYCODONE HYDROCHLORIDE 5 MG: 5 TABLET ORAL at 00:39

## 2022-08-28 RX ADMIN — ONDANSETRON 4 MG: 2 INJECTION INTRAMUSCULAR; INTRAVENOUS at 03:27

## 2022-08-28 RX ADMIN — SODIUM CHLORIDE, POTASSIUM CHLORIDE, SODIUM LACTATE AND CALCIUM CHLORIDE: 600; 310; 30; 20 INJECTION, SOLUTION INTRAVENOUS at 03:30

## 2022-08-28 RX ADMIN — ENOXAPARIN SODIUM 30 MG: 100 INJECTION SUBCUTANEOUS at 08:39

## 2022-08-28 RX ADMIN — OXYCODONE HYDROCHLORIDE 5 MG: 5 TABLET ORAL at 05:29

## 2022-08-28 ASSESSMENT — PAIN SCALES - GENERAL
PAINLEVEL_OUTOF10: 7
PAINLEVEL_OUTOF10: 9
PAINLEVEL_OUTOF10: 5

## 2022-08-28 ASSESSMENT — PAIN DESCRIPTION - LOCATION
LOCATION: ABDOMEN

## 2022-08-28 ASSESSMENT — PAIN DESCRIPTION - DESCRIPTORS
DESCRIPTORS: ACHING;THROBBING
DESCRIPTORS: CRAMPING;ACHING

## 2022-08-28 NOTE — DISCHARGE SUMMARY
Discharge Summary      Patient ID: Erica Faust      Patient's PCP: Keyonna Jefferson Date: 8/27/2022     Discharge Date:   8/28/2022    Admitting Provider: Arminda Vidal MD    Discharging Provider: ELENI Vargas     Reason for this admission:   Acute pancreatitis     Discharge Diagnoses: Active Hospital Problems    Diagnosis Date Noted    Anxiety [F41.9] 08/28/2022    Acute pancreatitis without infection or necrosis [K85.90] 08/27/2022    Transaminitis [R74.01] 08/27/2022    Hyperbilirubinemia [E80.6] 08/27/2022    Morbid obesity ([address] [E66.01] 08/27/2022    H/O gastric sleeve [Z90.3] 08/27/2022       Procedures:  Signed by Farshad Bravo MD on 08/27/22 9453      Patient: Kim King  Time Out: 08:37   Exam(s): CT ABDOMEN + PELVIS With Contrast        ADDENDUM - Added by Farshad Bravo MD on 8/27/2022 8:36 AM (-07:00)   Please note the following corrections: The APPENDIX is normal.       The PANCREAS appears diffusely enlarged with extensive edema and    fluid surrounding it. The findings are consistent with acute    PANCREATITIS. No evidence of pancreatic necrosis or significant    pancreatic ductal dilatation.   ---------------------------------------------------------------       EXAM:     CT Abdomen and Pelvis With Intravenous Contrast       CLINICAL HISTORY:     Elevated Lipase; Elevated liver/bilirubin; Vomiting; Rule out    choledolcholithiasis. TECHNIQUE:     Axial computed tomography images of the abdomen and pelvis with    intravenous contrast.  CTDI is 23 mGy and DLP is 1321 mGy-cm. One    or more of the following dose reduction techniques were used:    automated exposure control, adjustment of the mA and/or kV    according to patient size, use of iterative reconstruction    technique. COMPARISON:     No relevant prior studies available. FINDINGS:     Lung bases:  Unremarkable. No mass. No consolidation. ABDOMEN:     Liver:  Unremarkable. No mass. Gallbladder and bile ducts:  Unremarkable. No high density    stones. No high density stones seen in the bile duct. Pancreas:  Unremarkable. No mass. No ductal dilation. Spleen:  Unremarkable. No splenomegaly. Adrenals:  Unremarkable. No mass. Kidneys and ureters:  Unremarkable. No solid mass. No    hydronephrosis. Stomach and bowel:  Previous gastric sleeve procedure. No    obstruction. No mucosal thickening. PELVIS:     Appendix:  The pancreas appears diffusely enlarged with    extensive edema and fluid surrounding it. The findings are    consistent with acute appendicitis. No evidence of pancreatic    necrosis or significant pancreatic ductal dilatation. Bladder:  Unremarkable. No mass. Reproductive:  Unremarkable as visualized. ABDOMEN and PELVIS:     Intraperitoneal space:  No focal loculated fluid collection. No    free air. Bones/joints:  No acute fracture. No dislocation. Soft tissues:  Unremarkable. Vasculature:  Unremarkable. No abdominal aortic aneurysm. Lymph nodes:  Unremarkable. No enlarged lymph nodes. IMPRESSION:            Findings consistent with acute pancreatitis. No focal rounded    fluid collections. Consults:   None    Briefly:   Mr. Lizzie Cotter is a 23 yo male with history of anxiety and morbid obesity s/p gastric sleeve September 2020 with Dr. Christofer Reddy who was admitted due to acute pancreatitis. Due to worsening hyperbilirubinemia and concern for gallstones not visualized on CT imaging he will be transferred to [facility] [place] [facility] for further evaluation and management. See details of hospital course below. Hospital Course: Active Hospital Problems    Diagnosis Date Noted    Anxiety [F41.9]  - continue home zoloft  08/28/2022    Acute pancreatitis without infection or necrosis [K85.90]  - Lipase > 3000 on arrival. CT abd/pelv with contrast with evidence of acute pancreatitis. No ductal dilatation.  No high density gallstones noted and gallbladder unremarkable. - Denies alcoholism and ethanol negative  - Etiology unclear   - On arrival bilirubin elevated at 4.1 and also with notable transaminitis concerning for obstructive picture however CT without evidence of this as above   - Hydrated aggressively with LR. Anti emetics prn. IV and PO pain medication prn. NPO.   - 8/28 lipase, ALT, AST improving however bilirubin trended up to 7.2. Clinically patient without much improvement in pain, nausea, vomiting. Unable to obtain US at this facility until tomorrow AM and concerned for obstructive etiology with worsening hyperbilirubinemia.   - Discussed the case with GI Dr. Linn Dawkins who felt patient may benefit from ERCP for suspected gallstone etiology. Discussed the case with hospitalist Dr. Kaur Coleman who accepted the patient for transfer. Patient and family notified of plan of care and agreeable. 08/27/2022    Transaminitis [R74.01]  - see above    08/27/2022    Hyperbilirubinemia [E80.6]  - see above 08/27/2022    Morbid obesity ([address] [Y40.70]  - complicates all aspects of care. BMI 37. Patient reports significant weight loss in last 2 years following gastric sleeve. 08/27/2022    H/O gastric sleeve [Z90.3]  - see above 08/27/2022       Disposition: Transfer to [facility]    Discharged Condition: Stable    Vital Signs  Temp: 98.1 °F (36.7 °C)  Heart Rate: 62  Resp: 20  BP: (!) 140/83  SpO2: 98 %  O2 Device: None (Room air)       Vital signs reviewed in electronic chart. Physical exam  Constitutional:  Well developed, well nourished, no acute distress  Eyes:  PERRL, +scleral icterus, conjunctiva normal   HENT:  Atraumatic, external ears normal, nose normal, oropharynx moist, no pharyngeal exudates.  Neck- supple, no JVD, no lymphadenopathy  Respiratory:  No respiratory distress, no wheezing, rales or rhonchi detected  Cardiovascular:  Normal rate, normal rhythm, no murmurs, no gallops, no rubs, no edema   GI:  Soft, obese, nondistended, normal bowel sounds, TTP upper abdomen worse in RUQ and epigastric region, no voluntary guarding  Musculoskeletal:  No cyanosis or obvious acute deformity. Moving all extremities   Integument:  Warm and dry. Neurologic:  Alert & oriented x 3, no apparent focal deficits noted   Psychiatric:  Speech and behavior appropriate       Activity: activity as tolerated  Diet:  NPO      Labs: For convenience and continuity at follow-up the following most recent labs are provided:    CBC:   Lab Results   Component Value Date/Time    WBC 10.4 08/28/2022 05:16 AM    HGB 14.5 08/28/2022 05:16 AM    HCT 43.6 08/28/2022 05:16 AM     08/28/2022 05:16 AM       RENAL:   Lab Results   Component Value Date/Time     08/28/2022 05:16 AM    K 4.0 08/28/2022 05:16 AM     08/28/2022 05:16 AM    CO2 28 08/28/2022 05:16 AM    BUN 10 08/28/2022 05:16 AM    CREATININE 0.8 08/28/2022 05:16 AM     Lab Results   Component Value Date/Time    ALKPHOS 123 08/28/2022 05:16 AM     08/28/2022 05:16 AM     08/28/2022 05:16 AM    PROT 6.9 08/28/2022 05:16 AM    BILITOT 7.2 08/28/2022 05:16 AM    LABALBU 4.0 08/28/2022 05:16 AM     Lab Results   Component Value Date    CHOL 167 08/27/2022    CHOL 229 (H) 04/07/2014     Lab Results   Component Value Date    TRIG 72 08/27/2022    TRIG 263 (H) 04/07/2014     Lab Results   Component Value Date    HDL 49 08/27/2022     Lab Results   Component Value Date    LDLCALC 104 08/27/2022    LDLCALC 164 (H) 04/07/2014     Lab Results   Component Value Date    LABVLDL 14 08/27/2022     Lab Results   Component Value Date    CHOLHDLRATIO 7.0 (H) 04/07/2014       Current Home Medication List             Details   sertraline (ZOLOFT) 100 MG tablet Take 50 mg by mouth daily Unsure of dose              Patient was seen and examined with Dr. Gilbert Wells.  After reviewing patient data and diagnostic testing the plan of care was established in conjunction with  [facility]. Signed:  Electronically signed by ELENI Balderas on 8/28/2022 at 9:44 AM       Thank you RENETTA Ruth for the opportunity to be involved in this patient's care. If you have any questions or concerns please feel free to contact me at [phone].

## 2022-08-28 NOTE — PLAN OF CARE
Problem: Discharge Planning  Goal: Discharge to home or other facility with appropriate resources  8/28/2022 1121 by Santosh Abdullahi, RN  Outcome: Completed  8/28/2022 0200 by Amos Ireland RN  Outcome: Progressing  Flowsheets (Taken 8/27/2022 2100)  Discharge to home or other facility with appropriate resources:   Identify barriers to discharge with patient and caregiver   Arrange for needed discharge resources and transportation as appropriate   Refer to discharge planning if patient needs post-hospital services based on physician order or complex needs related to functional status, cognitive ability or social support system     Problem: Safety - Adult  Goal: Free from fall injury  8/28/2022 1121 by Santosh Abdullahi, RN  Outcome: Completed  8/28/2022 0200 by Amos Ireland RN  Outcome: Progressing     Problem: Pain  Goal: Verbalizes/displays adequate comfort level or baseline comfort level  Outcome: Completed

## 2022-08-28 NOTE — PROGRESS NOTES
Report given to KULWINDER Morrison at [facility], [address]. Patient to go to [address]. [phone]. Called EMS for transport.